# Patient Record
Sex: FEMALE | Race: WHITE | URBAN - METROPOLITAN AREA
[De-identification: names, ages, dates, MRNs, and addresses within clinical notes are randomized per-mention and may not be internally consistent; named-entity substitution may affect disease eponyms.]

---

## 2022-01-02 ENCOUNTER — OFFICE VISIT (OUTPATIENT)
Dept: URGENT CARE | Age: 27
End: 2022-01-02

## 2022-01-02 DIAGNOSIS — U07.1 COVID-19: Primary | ICD-10-CM

## 2022-01-02 PROCEDURE — 99202 OFFICE O/P NEW SF 15 MIN: CPT

## 2022-01-02 ASSESSMENT — ENCOUNTER SYMPTOMS
VOMITING: 0
COUGH: 1
NAUSEA: 0
SORE THROAT: 1

## 2022-01-02 NOTE — PATIENT INSTRUCTIONS
COVID swab collected in office today---positive  Patient declined prescribed medications. OTC medications to treat symptoms. Obtain rest.  Maintain hydration. Wash hands often with soap and water. Avoid smoke and other irritants. Wear face covering in public and follow social distancing recommendations. Discussed precautions and indications for returning to clinic. Discussed precautions and indications for seeking ED care. Recommend self quarantine for 5 days from symptom onset or positive test result and fever free for 24 hours    Patient Education        10 Things to Do When You Have COVID-19      Stay home. Don't go to school, work, or public areas. And don't use public transportation, ride-shares, or taxis unless you have no choice. Leave your home only if you need to get medical care. But call the doctor's office first so they know you're coming. And wear a mask. Ask before leaving isolation. Follow your doctor's advice about when it is safe for you to leave isolation. Wear a mask when you are around other people. It can help stop the spread of the virus. Limit contact with people in your home. If possible, stay in a separate bedroom and use a separate bathroom. Avoid contact with pets and other animals. If possible, have a friend or family member care for them while you're sick. Cover your mouth and nose with a tissue when you cough or sneeze. Then throw the tissue in the trash right away. Wash your hands often, especially after you cough or sneeze. Use soap and water, and scrub for at least 20 seconds. If soap and water aren't available, use an alcohol-based hand . Don't share personal household items. These include bedding, towels, cups and glasses, and eating utensils. Clean and disinfect your home every day. Use household  or disinfectant wipes or sprays.      If needed, take acetaminophen (Tylenol) or ibuprofen (Advil, Motrin) to relieve fever and body aches. Read and follow all instructions on the label. Current as of: March 26, 2021               Content Version: 13.1  © 9539-3003 [employer]. Care instructions adapted under license by [facility] ([facility]). If you have questions about a medical condition or this instruction, always ask your healthcare professional. [place] [address] any warranty or liability for your use of this information.

## 2022-01-02 NOTE — LETTER
NOTIFICATION RETURN TO WORK / SCHOOL    1/2/2022    Ms. [address]  [address] [place] [address]      To Whom It May Concern:    Alivia Wagner was tested for COVID-19 on 1/2/22, and the result was positive. She may return to work after completion of quarantine. I recommend:quarantine for 5 days and then if asymptomatic may return to work and mask for 5 days per [facility] guidelines. If there are questions or concerns, please have the patient contact our office.         Sincerely,      Leelee Aceves, NAM - CNP

## 2022-01-02 NOTE — PROGRESS NOTES
Deon Regan (: 1995) is a 31 y.o. female here for evaluation of the following chief complaint(s):  Covid Testing      SUBJECTIVE:  HPI   Pt presents today with c/o fatigue, sinus congestion and pressure for the last few days. She states otc medications have not helped with her symptoms. She denies any known exposure to covid, but would like to be tested today. Review of Systems   Constitutional: Positive for fatigue. Negative for fever. HENT: Positive for congestion, postnasal drip and sore throat. Negative for ear pain. Respiratory: Positive for cough. Gastrointestinal: Negative for nausea and vomiting. Musculoskeletal: Negative for arthralgias and myalgias. Neurological: Negative for dizziness and headaches. OBJECTIVE:  There were no vitals taken for this visit. Physical Exam  Constitutional:       Appearance: She is ill-appearing. HENT:      Head: Normocephalic. Right Ear: Tympanic membrane normal.      Left Ear: Tympanic membrane normal.      Nose: Congestion and rhinorrhea present. Mouth/Throat:      Mouth: Mucous membranes are moist.      Pharynx: Posterior oropharyngeal erythema present. No oropharyngeal exudate. Eyes:      Extraocular Movements: Extraocular movements intact. Conjunctiva/sclera: Conjunctivae normal.      Pupils: Pupils are equal, round, and reactive to light. Cardiovascular:      Rate and Rhythm: Normal rate and regular rhythm. Pulses: Normal pulses. Heart sounds: Normal heart sounds. Pulmonary:      Effort: Pulmonary effort is normal.      Breath sounds: Normal breath sounds. Abdominal:      General: Abdomen is flat. Bowel sounds are normal.      Palpations: Abdomen is soft. Musculoskeletal:         General: Normal range of motion. Cervical back: Normal range of motion. Skin:     General: Skin is warm and dry. Capillary Refill: Capillary refill takes less than 2 seconds.    Neurological:      General: No